# Patient Record
Sex: FEMALE | Race: BLACK OR AFRICAN AMERICAN | NOT HISPANIC OR LATINO | URBAN - METROPOLITAN AREA
[De-identification: names, ages, dates, MRNs, and addresses within clinical notes are randomized per-mention and may not be internally consistent; named-entity substitution may affect disease eponyms.]

---

## 2019-06-14 PROBLEM — Z00.00 ENCOUNTER FOR PREVENTIVE HEALTH EXAMINATION: Status: ACTIVE | Noted: 2019-06-14

## 2019-06-17 ENCOUNTER — OUTPATIENT (OUTPATIENT)
Dept: OUTPATIENT SERVICES | Facility: HOSPITAL | Age: 59
LOS: 1 days | End: 2019-06-17
Payer: COMMERCIAL

## 2019-06-17 DIAGNOSIS — Z01.818 ENCOUNTER FOR OTHER PREPROCEDURAL EXAMINATION: ICD-10-CM

## 2019-06-17 LAB
ANION GAP SERPL CALC-SCNC: 13 MMOL/L — SIGNIFICANT CHANGE UP (ref 5–17)
APTT BLD: 33.1 SEC — SIGNIFICANT CHANGE UP (ref 27.5–36.3)
BASOPHILS # BLD AUTO: 0 K/UL — SIGNIFICANT CHANGE UP (ref 0–0.2)
BASOPHILS NFR BLD AUTO: 0.4 % — SIGNIFICANT CHANGE UP (ref 0–2)
BUN SERPL-MCNC: 13 MG/DL — SIGNIFICANT CHANGE UP (ref 8–20)
CALCIUM SERPL-MCNC: 9.8 MG/DL — SIGNIFICANT CHANGE UP (ref 8.6–10.2)
CHLORIDE SERPL-SCNC: 100 MMOL/L — SIGNIFICANT CHANGE UP (ref 98–107)
CO2 SERPL-SCNC: 29 MMOL/L — SIGNIFICANT CHANGE UP (ref 22–29)
CREAT SERPL-MCNC: 0.5 MG/DL — SIGNIFICANT CHANGE UP (ref 0.5–1.3)
EOSINOPHIL # BLD AUTO: 0.2 K/UL — SIGNIFICANT CHANGE UP (ref 0–0.5)
EOSINOPHIL NFR BLD AUTO: 2.6 % — SIGNIFICANT CHANGE UP (ref 0–6)
GLUCOSE SERPL-MCNC: 138 MG/DL — HIGH (ref 70–115)
HBA1C BLD-MCNC: 7.8 % — HIGH (ref 4–5.6)
HCT VFR BLD CALC: 37.1 % — SIGNIFICANT CHANGE UP (ref 37–47)
HGB BLD-MCNC: 11.7 G/DL — LOW (ref 12–16)
INR BLD: 0.99 RATIO — SIGNIFICANT CHANGE UP (ref 0.88–1.16)
LYMPHOCYTES # BLD AUTO: 3.6 K/UL — SIGNIFICANT CHANGE UP (ref 1–4.8)
LYMPHOCYTES # BLD AUTO: 43.4 % — SIGNIFICANT CHANGE UP (ref 20–55)
MCHC RBC-ENTMCNC: 26.8 PG — LOW (ref 27–31)
MCHC RBC-ENTMCNC: 31.5 G/DL — LOW (ref 32–36)
MCV RBC AUTO: 84.9 FL — SIGNIFICANT CHANGE UP (ref 81–99)
MONOCYTES # BLD AUTO: 0.4 K/UL — SIGNIFICANT CHANGE UP (ref 0–0.8)
MONOCYTES NFR BLD AUTO: 5.2 % — SIGNIFICANT CHANGE UP (ref 3–10)
NEUTROPHILS # BLD AUTO: 4 K/UL — SIGNIFICANT CHANGE UP (ref 1.8–8)
NEUTROPHILS NFR BLD AUTO: 48.3 % — SIGNIFICANT CHANGE UP (ref 37–73)
PLATELET # BLD AUTO: 265 K/UL — SIGNIFICANT CHANGE UP (ref 150–400)
POTASSIUM SERPL-MCNC: 3.3 MMOL/L — LOW (ref 3.5–5.3)
POTASSIUM SERPL-SCNC: 3.3 MMOL/L — LOW (ref 3.5–5.3)
PROTHROM AB SERPL-ACNC: 11.4 SEC — SIGNIFICANT CHANGE UP (ref 10–12.9)
RBC # BLD: 4.37 M/UL — LOW (ref 4.4–5.2)
RBC # FLD: 13.7 % — SIGNIFICANT CHANGE UP (ref 11–15.6)
SODIUM SERPL-SCNC: 142 MMOL/L — SIGNIFICANT CHANGE UP (ref 135–145)
WBC # BLD: 8.3 K/UL — SIGNIFICANT CHANGE UP (ref 4.8–10.8)
WBC # FLD AUTO: 8.3 K/UL — SIGNIFICANT CHANGE UP (ref 4.8–10.8)

## 2019-06-17 PROCEDURE — 71046 X-RAY EXAM CHEST 2 VIEWS: CPT

## 2019-06-17 PROCEDURE — 83036 HEMOGLOBIN GLYCOSYLATED A1C: CPT

## 2019-06-17 PROCEDURE — 36415 COLL VENOUS BLD VENIPUNCTURE: CPT

## 2019-06-17 PROCEDURE — 85610 PROTHROMBIN TIME: CPT

## 2019-06-17 PROCEDURE — 85027 COMPLETE CBC AUTOMATED: CPT

## 2019-06-17 PROCEDURE — 85730 THROMBOPLASTIN TIME PARTIAL: CPT

## 2019-06-17 PROCEDURE — 93005 ELECTROCARDIOGRAM TRACING: CPT

## 2019-06-17 PROCEDURE — 71046 X-RAY EXAM CHEST 2 VIEWS: CPT | Mod: 26

## 2019-06-17 PROCEDURE — 93010 ELECTROCARDIOGRAM REPORT: CPT

## 2019-06-17 PROCEDURE — 80048 BASIC METABOLIC PNL TOTAL CA: CPT

## 2019-06-17 PROCEDURE — G0463: CPT

## 2019-06-20 ENCOUNTER — RESULT REVIEW (OUTPATIENT)
Age: 59
End: 2019-06-20

## 2021-02-18 ENCOUNTER — EMERGENCY (EMERGENCY)
Facility: HOSPITAL | Age: 61
LOS: 1 days | Discharge: HOME | End: 2021-02-18
Attending: STUDENT IN AN ORGANIZED HEALTH CARE EDUCATION/TRAINING PROGRAM
Payer: MEDICAID

## 2021-02-18 VITALS
SYSTOLIC BLOOD PRESSURE: 130 MMHG | RESPIRATION RATE: 18 BRPM | HEART RATE: 88 BPM | HEIGHT: 61 IN | WEIGHT: 147.05 LBS | OXYGEN SATURATION: 98 % | DIASTOLIC BLOOD PRESSURE: 87 MMHG | TEMPERATURE: 98 F

## 2021-02-18 VITALS
RESPIRATION RATE: 17 BRPM | SYSTOLIC BLOOD PRESSURE: 142 MMHG | DIASTOLIC BLOOD PRESSURE: 78 MMHG | OXYGEN SATURATION: 100 % | TEMPERATURE: 98 F | HEART RATE: 74 BPM

## 2021-02-18 LAB
ALBUMIN SERPL ELPH-MCNC: 4.6 G/DL — SIGNIFICANT CHANGE UP (ref 3.3–5.2)
ALP SERPL-CCNC: 80 U/L — SIGNIFICANT CHANGE UP (ref 40–120)
ALT FLD-CCNC: 32 U/L — SIGNIFICANT CHANGE UP
ANION GAP SERPL CALC-SCNC: 15 MMOL/L — SIGNIFICANT CHANGE UP (ref 5–17)
AST SERPL-CCNC: 28 U/L — SIGNIFICANT CHANGE UP
BASOPHILS # BLD AUTO: 0.05 K/UL — SIGNIFICANT CHANGE UP (ref 0–0.2)
BASOPHILS NFR BLD AUTO: 0.6 % — SIGNIFICANT CHANGE UP (ref 0–2)
BILIRUB SERPL-MCNC: 0.2 MG/DL — LOW (ref 0.4–2)
BUN SERPL-MCNC: 12 MG/DL — SIGNIFICANT CHANGE UP (ref 8–20)
CALCIUM SERPL-MCNC: 9.7 MG/DL — SIGNIFICANT CHANGE UP (ref 8.6–10.2)
CHLORIDE SERPL-SCNC: 99 MMOL/L — SIGNIFICANT CHANGE UP (ref 98–107)
CO2 SERPL-SCNC: 26 MMOL/L — SIGNIFICANT CHANGE UP (ref 22–29)
CREAT SERPL-MCNC: 0.4 MG/DL — LOW (ref 0.5–1.3)
EOSINOPHIL # BLD AUTO: 0.26 K/UL — SIGNIFICANT CHANGE UP (ref 0–0.5)
EOSINOPHIL NFR BLD AUTO: 3.1 % — SIGNIFICANT CHANGE UP (ref 0–6)
GLUCOSE SERPL-MCNC: 109 MG/DL — HIGH (ref 70–99)
HCT VFR BLD CALC: 39.6 % — SIGNIFICANT CHANGE UP (ref 34.5–45)
HGB BLD-MCNC: 12.6 G/DL — SIGNIFICANT CHANGE UP (ref 11.5–15.5)
IMM GRANULOCYTES NFR BLD AUTO: 0.2 % — SIGNIFICANT CHANGE UP (ref 0–1.5)
LYMPHOCYTES # BLD AUTO: 3.11 K/UL — SIGNIFICANT CHANGE UP (ref 1–3.3)
LYMPHOCYTES # BLD AUTO: 36.8 % — SIGNIFICANT CHANGE UP (ref 13–44)
MCHC RBC-ENTMCNC: 27.7 PG — SIGNIFICANT CHANGE UP (ref 27–34)
MCHC RBC-ENTMCNC: 31.8 GM/DL — LOW (ref 32–36)
MCV RBC AUTO: 87 FL — SIGNIFICANT CHANGE UP (ref 80–100)
MONOCYTES # BLD AUTO: 0.52 K/UL — SIGNIFICANT CHANGE UP (ref 0–0.9)
MONOCYTES NFR BLD AUTO: 6.2 % — SIGNIFICANT CHANGE UP (ref 2–14)
NEUTROPHILS # BLD AUTO: 4.49 K/UL — SIGNIFICANT CHANGE UP (ref 1.8–7.4)
NEUTROPHILS NFR BLD AUTO: 53.1 % — SIGNIFICANT CHANGE UP (ref 43–77)
PLATELET # BLD AUTO: 290 K/UL — SIGNIFICANT CHANGE UP (ref 150–400)
POTASSIUM SERPL-MCNC: 4.1 MMOL/L — SIGNIFICANT CHANGE UP (ref 3.5–5.3)
POTASSIUM SERPL-SCNC: 4.1 MMOL/L — SIGNIFICANT CHANGE UP (ref 3.5–5.3)
PROT SERPL-MCNC: 7.8 G/DL — SIGNIFICANT CHANGE UP (ref 6.6–8.7)
RBC # BLD: 4.55 M/UL — SIGNIFICANT CHANGE UP (ref 3.8–5.2)
RBC # FLD: 12.8 % — SIGNIFICANT CHANGE UP (ref 10.3–14.5)
SODIUM SERPL-SCNC: 140 MMOL/L — SIGNIFICANT CHANGE UP (ref 135–145)
WBC # BLD: 8.45 K/UL — SIGNIFICANT CHANGE UP (ref 3.8–10.5)
WBC # FLD AUTO: 8.45 K/UL — SIGNIFICANT CHANGE UP (ref 3.8–10.5)

## 2021-02-18 PROCEDURE — 99283 EMERGENCY DEPT VISIT LOW MDM: CPT

## 2021-02-18 PROCEDURE — 99284 EMERGENCY DEPT VISIT MOD MDM: CPT

## 2021-02-18 PROCEDURE — 72125 CT NECK SPINE W/O DYE: CPT

## 2021-02-18 PROCEDURE — 99284 EMERGENCY DEPT VISIT MOD MDM: CPT | Mod: 25

## 2021-02-18 PROCEDURE — 70496 CT ANGIOGRAPHY HEAD: CPT | Mod: 26,MA

## 2021-02-18 PROCEDURE — 70450 CT HEAD/BRAIN W/O DYE: CPT | Mod: 26,59

## 2021-02-18 PROCEDURE — 36415 COLL VENOUS BLD VENIPUNCTURE: CPT

## 2021-02-18 PROCEDURE — 72125 CT NECK SPINE W/O DYE: CPT | Mod: 26

## 2021-02-18 PROCEDURE — 85025 COMPLETE CBC W/AUTO DIFF WBC: CPT

## 2021-02-18 PROCEDURE — 70496 CT ANGIOGRAPHY HEAD: CPT

## 2021-02-18 PROCEDURE — 80053 COMPREHEN METABOLIC PANEL: CPT

## 2021-02-18 PROCEDURE — 70498 CT ANGIOGRAPHY NECK: CPT

## 2021-02-18 PROCEDURE — 70498 CT ANGIOGRAPHY NECK: CPT | Mod: 26,MA

## 2021-02-18 PROCEDURE — 70450 CT HEAD/BRAIN W/O DYE: CPT

## 2021-02-18 RX ORDER — METFORMIN HYDROCHLORIDE 850 MG/1
0 TABLET ORAL
Qty: 0 | Refills: 0 | DISCHARGE

## 2021-02-18 RX ORDER — SIMVASTATIN 20 MG/1
0 TABLET, FILM COATED ORAL
Qty: 0 | Refills: 0 | DISCHARGE

## 2021-02-18 RX ORDER — ACETAMINOPHEN 500 MG
975 TABLET ORAL ONCE
Refills: 0 | Status: COMPLETED | OUTPATIENT
Start: 2021-02-18 | End: 2021-02-18

## 2021-02-18 RX ADMIN — Medication 975 MILLIGRAM(S): at 15:23

## 2021-02-18 NOTE — ED STATDOCS - NS ED ROS FT
ROS:  GEN: (-) fevers/chills  NECK: (-) stiffness, (-) swelling  RESP: (-) shortness of breath, (-) cough  CV: (-) chest pain, (-) palpitations  GI: (-) nausea, (-) vomiting, (-) pain, (-) constipation, (-) diarrhea  : (-) hematuria, (-) dysuria  EXT: (-) edema  NEURO: (-) weakness, (+) headache, (-) dizziness, (-) syncope  MSK: (-) muscle pain

## 2021-02-18 NOTE — ED ADULT NURSE NOTE - NSIMPLEMENTINTERV_GEN_ALL_ED
Implemented All Fall Risk Interventions:  [place] to call system. Call bell, personal items and telephone within reach. Instruct patient to call for assistance. Room bathroom lighting operational. Non-slip footwear when patient is off stretcher. Physically safe environment: no spills, clutter or unnecessary equipment. Stretcher in lowest position, wheels locked, appropriate side rails in place. Provide visual cue, wrist band, yellow gown, etc. Monitor gait and stability. Monitor for mental status changes and reorient to person, place, and time. Review medications for side effects contributing to fall risk. Reinforce activity limits and safety measures with patient and family.

## 2021-02-18 NOTE — CHART NOTE - NSCHARTNOTEFT_GEN_A_CORE
Imaging results reviewed with attending.     CT Angio Neck w/ IV Cont (02.18.21 @ 18:01)   CT of the head demonstrates the ventricles and sulci to be normal in appearance. No intracranial mass effect or hemorrhage is seen. Again noted is a hyperdense soft tissue focus along the falx within the parietal region likely representing a calcified meningioma which crosses midline. A prominent lucency is seen below the right lentiform nucleus likely representing a dilated perivascular space. No mass effect or hemorrhage is seen.  IMPRESSION: Tortuosity of the cervical internal carotid arteries. Ectasia of the distal vertebral arteries. No occlusion, significant stenosis or other vascular abnormality identified.    Patient may follow up outpatient PRN w/ Dr. Rehman

## 2021-02-18 NOTE — ED ADULT TRIAGE NOTE - CHIEF COMPLAINT QUOTE
pt A&OX 4 from home s/p slip & fall on ice and hitting back of head on concrete. No bleeding noted, pt c/o nausea, HA, and generalized weakness.

## 2021-02-18 NOTE — CONSULT NOTE ADULT - ASSESSMENT
60F w/ PMHX of HTN, DMII on Metformin, asthma, osteoporosis s/p slip and fall on ice this AM, hitting back of head, -LOC, c/o nausea, head/neck pain. Patient states she has felt "off balance" for ~1 year, denies any other falls  - CTH possible meningioma        Plan  - Imaging reviewed  - HOB 30 degrees  - Recommend CTA Head/Neck r/o aneurysm   - Pain control as needed  - STAT CTH for worsening neuro status  - Medical management/ supportive care per primary team  - Further plan to follow imaging  - No acute neurosurgical intervention at this time  - D/w Dr. Rehman

## 2021-02-18 NOTE — ED STATDOCS - NSFOLLOWUPINSTRUCTIONS_ED_ALL_ED_FT
Follow up with neurosurgery in 1-2 weeks   Take Motrin and Tylenol as needed for pain   Follow up with your primary medical doctor in 2-3 days       Meningioma    WHAT YOU NEED TO KNOW:    A meningioma is a tumor that starts in the meninges of the brain and spinal cord. The meninges are the tissues that cover the brain and spinal cord. They prevent germs and other substances from entering the brain and spinal cord. Most meningiomas are slow-growing and benign (not cancer).    DISCHARGE INSTRUCTIONS:    Medicines:   •Medicines may be given to kill the tumor cells and decrease the size of the meningioma.     •Take your medicine as directed. Contact your healthcare provider if you think your medicine is not helping or if you have side effects. Tell him or her if you are allergic to any medicine. Keep a list of the medicines, vitamins, and herbs you take. Include the amounts, and when and why you take them. Bring the list or the pill bottles to follow-up visits. Carry your medicine list with you in case of an emergency.    Follow up with your healthcare provider or surgeon as directed: Write down your questions so you remember to ask them during your visits.     Self-care:   •Drink liquids as directed. Ask how much liquid to drink each day and which liquids are best for you. If you have nausea or diarrhea from treatment, extra liquids may help decrease your risk for dehydration.    •Eat healthy foods. Healthy foods include fruits, vegetables, whole-grain breads, low-fat dairy products, beans, lean meats, and fish. This may help you feel better during treatment and decrease side effects. You may need to change what you eat during treatment. Do not eat foods or drink liquids that cause gas, such as cabbage, beans, onions, or soft drinks. A nutritionist may help to plan the best meals and snacks for you.    •Exercise. Ask about the best exercise plan for you. Exercise may improve your energy levels and appetite.    Contact your healthcare provider if:   •You have a fever.     •You vomit repeatedly, and cannot keep any food or liquids down.    •You have a severe headache, or you feel dizzy.    •You have questions or concerns about your condition or care.      Return to the emergency department if:   •You have any of the following signs of a stroke: ?Numbness or drooping on one side of your face     ?Weakness in an arm or leg    ?Confusion or difficulty speaking    ?Dizziness, a severe headache, or vision loss

## 2021-02-18 NOTE — ED STATDOCS - ATTENDING CONTRIBUTION TO CARE
I, Mary Alice Mcneil, performed a face to face bedside interview with this patient regarding history of present illness, review of symptoms and relevant past medical, social and family history.  I completed an independent physical examination. Medical decision making, follow-up on ordered tests (ie labs, radiologic studies) and re-evaluation of the patient's status has been communicated to the ACP.  Disposition of the patient will be based on test outcome and response to ED interventions.

## 2021-02-18 NOTE — ED STATDOCS - CARE PROVIDER_API CALL
Edinson Rehman; PhD)  Neurosurgery  [address] [place] [address]  Phone: [phone]  Fax: [phone]  Follow Up Time:

## 2021-02-18 NOTE — CONSULT NOTE ADULT - SUBJECTIVE AND OBJECTIVE BOX
HISTORY OF PRESENT ILLNESS:   60 year old female w/ PMHX of HTN, DMII on Metformin, asthma, osteoporosis s/p slip and fall on ice this AM, hitting back of head, -LOC, c/o nausea, head/neck pain. Patient states she has felt "off balance" for ~1 year, denies any other falls. Currently states HA has improved w/ Tylenol.    PAST MEDICAL & SURGICAL HISTORY:       FAMILY HISTORY:  Denies any family hx of brain aneurysms or brain ca    SOCIAL HISTORY:  Tobacco Use: denies  EtOH use: social  Substance: denies    Allergies:  penicillin (Hives)  sulfa drugs (Hives; Other)    REVIEW OF SYSTEMS  Negative except as noted in HPI    HOME MEDICATIONS:  Home Medications: Metformin, Simvastatin, other BP meds unsure which      MEDICATIONS:  Antibiotics:    Neuro:    Anticoagulation:    OTHER:    IVF:      Vital Signs Last 24 Hrs  T(C): 36.6 (2021 09:36), Max: 36.6 (2021 09:36)  T(F): 97.8 (2021 09:36), Max: 97.8 (2021 09:36)  HR: 88 (2021 09:36) (88 - 88)  BP: 130/87 (2021 09:36) (130/87 - 130/87)  BP(mean): --  RR: 18 (2021 09:36) (18 - 18)  SpO2: 98% (2021 09:36) (98% - 98%)      PHYSICAL EXAM:  GENERAL: NAD, well-groomed, well-developed  HEAD: Atraumatic, normocephalic. Tender top of head to light palpation. No raccoon eyes, no terrazas signs b/l.   ENMT: Moist mucous membranes  NECK: Cervical tenderness s/p fall (baseline herniated disc for 20+ years)  LAWRENCE COMA SCORE: E-4 V-5 M-6 = 15  MENTAL STATUS: AAO x3; Awake; Opens eyes spontaneously; Appropriately conversant without aphasia. following simple commands  CRANIAL NERVES: Visual acuity normal for age, PERRL. EOMI without nystagmus. Facial sensation intact V1-3 distribution b/l. Face symmetric w/ normal eye closure and smile, tongue midline. Hearing grossly intact. Speech clear.   MOTOR: strength 5/5 b/l upper and lower extremities, no drift b/l UE.  SENSATION: grossly intact to light touch all extremities  COORDINATION: no upper extremity dysmetria  CHEST/LUNG: Nonlabored breaths  ABDOMEN: Soft, nontender, nondistended.    LABS:                CULTURES:      RADIOLOGY & ADDITIONAL STUDIES:  CT Head No Cont (21 @ 12:39)   IMPRESSION:  There is no evidence of skull fracture, intracranial hemorrhage or acute lobar infarct.  There is a left parafalcine partially calcified hyperdense mass in the parietal lobule which has increased in size since prior remote examination, presumably represents a calcified meningioma. Further correlation with MRI of the brain with and without contrast is suggested for more detailed characterization.  No cervical spine fracture.

## 2021-02-18 NOTE — ED STATDOCS - PATIENT PORTAL LINK FT
You can access the FollowMyHealth Patient Portal offered by [facility] by registering at the following website: [email or web address]. By joining [employer]’s FollowMyHealth portal, you will also be able to view your health information using other applications (apps) compatible with our system.

## 2021-02-18 NOTE — ED STATDOCS - CLINICAL SUMMARY MEDICAL DECISION MAKING FREE TEXT BOX
Slipped on ice and hit head on ground No LOC. Headache on arrival, not on blood thinners. Check Head/neck Ct to r/o TBI Slipped on ice and hit head on ground No LOC. Headache on arrival, not on blood thinners. Check Head/neck Ct to r/o traumatic injury

## 2021-02-18 NOTE — ED STATDOCS - OBJECTIVE STATEMENT
59 y/o F pt with significant PMHx of HTN, and DM  presents to the ED s/p slip and fall on ice 1 hour ago. Pt notes hitting head but denies LOC or bleeding. C/o headache, nausea and weakness. Denies associated fever, chills, SOB, vomiting, diarrhea, dizziness at the time of evaluation.

## 2021-02-18 NOTE — ED ADULT NURSE NOTE - OBJECTIVE STATEMENT
60 F, NAD, alert and oriented x 3, c/o of HA to left occipital area onset 0830 s/p mechanical slip and fall on ice. Pt denies LOC, denies pain at this time, denies blood thinners,  denies any other injuries. No facial droop, + PERRL + NAYAK, + equal strength to all extremities bilaterally. Full physical assessment deferred to acp/physician.

## 2021-02-18 NOTE — ED STATDOCS - PROGRESS NOTE DETAILS
ESTUARDO RUTHERFORD: CTH with brain mass possible meningioma, consulted neurosurgery. NSG recommends CTA head and neck Spoke with neurosurgery PA regarding CTA reads. States pt can be discharged home with out pt NSG follow up. Copy of results printed and provided to the pt. Return precautions provided.

## 2021-02-24 ENCOUNTER — APPOINTMENT (OUTPATIENT)
Dept: NEUROSURGERY | Facility: CLINIC | Age: 61
End: 2021-02-24
Payer: MEDICAID

## 2021-02-24 VITALS
HEIGHT: 61 IN | HEART RATE: 78 BPM | TEMPERATURE: 98.3 F | DIASTOLIC BLOOD PRESSURE: 84 MMHG | BODY MASS INDEX: 27.75 KG/M2 | WEIGHT: 147 LBS | SYSTOLIC BLOOD PRESSURE: 145 MMHG | OXYGEN SATURATION: 98 %

## 2021-02-24 DIAGNOSIS — Z82.49 FAMILY HISTORY OF ISCHEMIC HEART DISEASE AND OTHER DISEASES OF THE CIRCULATORY SYSTEM: ICD-10-CM

## 2021-02-24 DIAGNOSIS — Z78.9 OTHER SPECIFIED HEALTH STATUS: ICD-10-CM

## 2021-02-24 DIAGNOSIS — Z86.79 PERSONAL HISTORY OF OTHER DISEASES OF THE CIRCULATORY SYSTEM: ICD-10-CM

## 2021-02-24 DIAGNOSIS — Z82.5 FAMILY HISTORY OF ASTHMA AND OTHER CHRONIC LOWER RESPIRATORY DISEASES: ICD-10-CM

## 2021-02-24 DIAGNOSIS — Z86.39 PERSONAL HISTORY OF OTHER ENDOCRINE, NUTRITIONAL AND METABOLIC DISEASE: ICD-10-CM

## 2021-02-24 DIAGNOSIS — Z80.9 FAMILY HISTORY OF MALIGNANT NEOPLASM, UNSPECIFIED: ICD-10-CM

## 2021-02-24 DIAGNOSIS — Z87.09 PERSONAL HISTORY OF OTHER DISEASES OF THE RESPIRATORY SYSTEM: ICD-10-CM

## 2021-02-24 DIAGNOSIS — Z63.4 DISAPPEARANCE AND DEATH OF FAMILY MEMBER: ICD-10-CM

## 2021-02-24 DIAGNOSIS — Z72.89 OTHER PROBLEMS RELATED TO LIFESTYLE: ICD-10-CM

## 2021-02-24 DIAGNOSIS — Z83.3 FAMILY HISTORY OF DIABETES MELLITUS: ICD-10-CM

## 2021-02-24 PROBLEM — E78.00 PURE HYPERCHOLESTEROLEMIA, UNSPECIFIED: Chronic | Status: ACTIVE | Noted: 2021-02-18

## 2021-02-24 PROBLEM — E11.9 TYPE 2 DIABETES MELLITUS WITHOUT COMPLICATIONS: Chronic | Status: ACTIVE | Noted: 2021-02-18

## 2021-02-24 PROCEDURE — 99214 OFFICE O/P EST MOD 30 MIN: CPT

## 2021-02-24 PROCEDURE — 99072 ADDL SUPL MATRL&STAF TM PHE: CPT

## 2021-02-24 RX ORDER — METFORMIN ER 500 MG 500 MG/1
500 TABLET ORAL
Refills: 0 | Status: ACTIVE | COMMUNITY

## 2021-02-24 RX ORDER — LOSARTAN POTASSIUM 100 MG/1
TABLET, FILM COATED ORAL
Refills: 0 | Status: ACTIVE | COMMUNITY

## 2021-02-24 RX ORDER — SIMVASTATIN 80 MG/1
TABLET, FILM COATED ORAL
Refills: 0 | Status: ACTIVE | COMMUNITY

## 2021-02-24 SDOH — SOCIAL STABILITY - SOCIAL INSECURITY: DISSAPEARANCE AND DEATH OF FAMILY MEMBER: Z63.4

## 2021-02-25 NOTE — PHYSICAL EXAM
[General Appearance - Alert] : alert [General Appearance - In No Acute Distress] : in no acute distress [General Appearance - Well Nourished] : well nourished [General Appearance - Well Developed] : well developed [Oriented To Time, Place, And Person] : oriented to person, place, and time [Impaired Insight] : insight and judgment were intact [Affect] : the affect was normal [Mood] : the mood was normal [Person] : oriented to person [Place] : oriented to place [Time] : oriented to time [Short Term Intact] : short term memory intact [Fluency] : fluency intact [Comprehension] : comprehension intact [Cranial Nerves Optic (II)] : visual acuity intact bilaterally,  pupils equal round and reactive to light [Cranial Nerves Oculomotor (III)] : extraocular motion intact [Cranial Nerves Trigeminal (V)] : facial sensation intact symmetrically [Cranial Nerves Facial (VII)] : face symmetrical [Cranial Nerves Glossopharyngeal (IX)] : tongue and palate midline [Cranial Nerves Accessory (XI - Cranial And Spinal)] : head turning and shoulder shrug symmetric [Cranial Nerves Hypoglossal (XII)] : there was no tongue deviation with protrusion [Motor Tone] : muscle tone was normal in all four extremities [Motor Strength] : muscle strength was normal in all four extremities [Sensation Tactile Decrease] : light touch was intact [Sensation Pain / Temperature Decrease] : pain and temperature was intact [Balance] : balance was intact [Over the Past 2 Weeks, Have You Felt Down, Depressed, or Hopeless?] : 1.) Over the past 2 weeks, have you felt down, depressed, or hopeless? No [Over the Past 2 Weeks, Have You Felt Little Interest or Pleasure Doing Things?] : 2.) Over the past 2 weeks, have you felt little interest or pleasure doing things? No [FreeTextEntry8] : difficulty with tandem walking

## 2021-02-25 NOTE — DATA REVIEWED
[de-identified] : \par  EXAM: CT ANGIO NECK (W)AW IC\par  EXAM: CT ANGIO BRAIN (W)AW IC\par \par PROCEDURE DATE: 02/18/2021\par \par \par \par INTERPRETATION: INDICATIONS: Status post fall and imbalance. Rule out aneurysm..\par \par TECHNIQUE: A CT scan of the head with and without intravenous contrast and a CT angiogram study of the neck and head were performed.\par \par The head CT was performed with 5 mm axial images. The CT angiogram study was performed with axial thin section images with 2-D and 3-D reformatted series.\par \par 90 cc intravenous Omnipaque 350 contrast was administered, 10 cc contrast was discarded.\par \par COMPARISON: Brain CT 2/18/2021\par \par FINDINGS:\par Neck CTA:\par \par The visualized aorta and great vessels are unremarkable. 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 There are separate origins of the left common carotid, right common carotid and right subclavian arteries from the aortic arch. The common carotid arteries are patent.\par The internal carotid arteries and external carotid arteries are patent. There is tortuosity of the cervical internal carotid arteries.\par The vertebral arteries are patent and codominant.\par \par Low-density right thyroid nodule 1.2 cm.\par \par Brain CTA:\par \par The distal internal carotid arteries are patent.\par The [place] of [place] is normal in appearance. There are fetal origins of the posterior cerebral arteries bilaterally.\par The visualized cerebral arteries are unremarkable.\par The vertebrobasilar junction and basilar artery are patent. The distal vertebral arteries are ectatic.\par \par All measurements are performed using standard NASCET criteria.\par \par CT of the head demonstrates the ventricles and sulci to be normal in appearance. No intracranial mass effect or hemorrhage is seen. Again noted is a hyperdense soft tissue focus along the falx within the parietal region likely representing a calcified meningioma which crosses midline. A prominent lucency is seen below the right lentiform nucleus likely representing a dilated perivascular space. No mass effect or hemorrhage is seen.\par \par IMPRESSION: Tortuosity of the cervical internal carotid arteries.\par \par Ectasia of the distal vertebral arteries.\par \par No occlusion, significant stenosis or other vascular abnormality identified.\par \par \par \par

## 2021-02-25 NOTE — REVIEW OF SYSTEMS
[Dizziness] : dizziness [Sore Throat] : sore throat [Palpitations] : palpitations [Wheezing] : wheezing [Joint Pain] : joint pain [As Noted in HPI] : as noted in HPI [Negative] : Heme/Lymph [Incontinence] : no incontinence [de-identified] : Headaches [FreeTextEntry4] : Ringing in ears

## 2021-02-25 NOTE — ASSESSMENT
[FreeTextEntry1] : Ms. Anat Momin presents with above history and imaging.\par She is neurologically intact.  \par \par Plan:\par MRI brain w/wo contrast in 3 months to assess interval progression of meningioma. \par Follow up after imaging for formal review. \par Physical therapy for assistance with balance and gait.\par Continue BP medications as ordered to maintained BP <140/90. \par Continue statin therapy with an LDL goal of < 70 for secondary stroke prevention.\par PMD for incidental finding of right thyroid nodule. \par Patient knows to call the office if there are any new or worsening symptoms.\par \par \par \par Stroke prevention requires management of risk factors and patient education.  Risk factors include smoking, excess weight, hypertension, dyslipidemia, heavy alcohol use, physical inactivity, obesity and diabetes.   Blood pressure should be < 140/90.  Lipid- lowering agents may reduce risk of stroke.  patient to maintain regular exercise and body weight and control intake of alcohol.\par

## 2021-02-25 NOTE — REASON FOR VISIT
[Follow-Up: _____] : a [unfilled] follow-up visit [Family Member] : family member [FreeTextEntry1] : Ms. Momin is a 60 year old female who presents for follow up visit/post hospitalization [facility]. She was s/p fall slipping on ice. \par Patient denies any headaches, n/v or visual disturbances.  She has not had any seizures.  Denies any numbness/tingling or weakness of extremities. Patient admits to unsteadiness of gait and difficulty with coordination.  \par \par Patient states she is compliant with her antihypertensives. \par Patient is a non smoker.\par No personal history of cancer.  Family history includes esophageal and lung cancer. \par \par PMD Dr. Osiel Moreno

## 2021-04-30 ENCOUNTER — APPOINTMENT (OUTPATIENT)
Dept: NEUROSURGERY | Facility: CLINIC | Age: 61
End: 2021-04-30
Payer: MEDICAID

## 2021-04-30 VITALS
BODY MASS INDEX: 27.94 KG/M2 | DIASTOLIC BLOOD PRESSURE: 82 MMHG | TEMPERATURE: 97.6 F | HEIGHT: 61 IN | WEIGHT: 148 LBS | HEART RATE: 79 BPM | OXYGEN SATURATION: 97 % | SYSTOLIC BLOOD PRESSURE: 137 MMHG

## 2021-04-30 PROCEDURE — 99213 OFFICE O/P EST LOW 20 MIN: CPT

## 2021-04-30 PROCEDURE — 99072 ADDL SUPL MATRL&STAF TM PHE: CPT

## 2021-05-02 NOTE — DATA REVIEWED
[de-identified] : R. Phys. Phone: [phone]\par MRI-3T BRAIN PRE AND POST IV CONTRAST\par \par HISTORY:\par \par Technique: Multiplanar, multisequence MR imaging of the brain was performed with\par and without 14 cc Clariscan.\par \par FINDINGS:\par \par Comparison:\par \par Brain:\par 10 mm falcine meningioma at the frontoparietal junction in the midline.\par There are a few small focal areas of increased T2 signal seen within the white\par matter both hemispheres.\par 8 mm [place]chow-Jeff spaces in the right anterior perforating substances.\par The remainder the brain parenchyma, ventricular system and subarachnoid spaces\par are normal.\par \par Ventricles: The ventricles, sulci, and cisterns are normal in caliber for\par patient's age without evidence for hydrocephalus.\par \par Flow voids: The flow voids at the skull base are unremarkable.\par \par Extra-axial spaces:There is no extra-axial collection or extra-axial mass.\par \par Extracranial findings: The craniocervical junction is within normal limits. 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

## 2021-05-02 NOTE — PHYSICAL EXAM
[General Appearance - Alert] : alert [General Appearance - In No Acute Distress] : in no acute distress [General Appearance - Well Nourished] : well nourished [General Appearance - Well Developed] : well developed [Oriented To Time, Place, And Person] : oriented to person, place, and time [Impaired Insight] : insight and judgment were intact [Affect] : the affect was normal [Mood] : the mood was normal [Person] : oriented to person [Place] : oriented to place [Time] : oriented to time [Short Term Intact] : short term memory intact [Fluency] : fluency intact [Comprehension] : comprehension intact [Cranial Nerves Optic (II)] : visual acuity intact bilaterally,  pupils equal round and reactive to light [Cranial Nerves Oculomotor (III)] : extraocular motion intact [Cranial Nerves Trigeminal (V)] : facial sensation intact symmetrically [Cranial Nerves Facial (VII)] : face symmetrical [Cranial Nerves Glossopharyngeal (IX)] : tongue and palate midline [Cranial Nerves Accessory (XI - Cranial And Spinal)] : head turning and shoulder shrug symmetric [Cranial Nerves Hypoglossal (XII)] : there was no tongue deviation with protrusion [Motor Tone] : muscle tone was normal in all four extremities [Motor Strength] : muscle strength was normal in all four extremities [Sensation Pain / Temperature Decrease] : pain and temperature was intact [Sensation Tactile Decrease] : light touch was intact [Balance] : balance was intact [Over the Past 2 Weeks, Have You Felt Down, Depressed, or Hopeless?] : 1.) Over the past 2 weeks, have you felt down, depressed, or hopeless? No [Over the Past 2 Weeks, Have You Felt Little Interest or Pleasure Doing Things?] : 2.) Over the past 2 weeks, have you felt little interest or pleasure doing things? No [FreeTextEntry8] : difficulty with tandem walking

## 2021-05-02 NOTE — ASSESSMENT
[FreeTextEntry1] : Ms. Momin presents with the above history and imaging\par She is neurologically intact\par \par \par No complaints referable to meningioma and no evidence of significant change when compared to previous.\par \par Plan:\par MRI brain w/wo contrast to surveillance of meningioma.\par f/u after imaging.\par Will recommend carotid doppler for evaluation of tortuous carotid vessels noted on CTA\par Patient knows to call the office if there are any new or worsening symptoms.\par \par \par Patient has been given an opportunity to ask and have their questions answered to their satisfaction.\par

## 2021-10-29 ENCOUNTER — APPOINTMENT (OUTPATIENT)
Dept: NEUROSURGERY | Facility: CLINIC | Age: 61
End: 2021-10-29
Payer: COMMERCIAL

## 2021-10-29 VITALS
WEIGHT: 149 LBS | DIASTOLIC BLOOD PRESSURE: 81 MMHG | HEART RATE: 63 BPM | OXYGEN SATURATION: 8 % | BODY MASS INDEX: 28.13 KG/M2 | HEIGHT: 61 IN | SYSTOLIC BLOOD PRESSURE: 160 MMHG | TEMPERATURE: 98.1 F

## 2021-10-29 DIAGNOSIS — D32.9 BENIGN NEOPLASM OF MENINGES, UNSPECIFIED: ICD-10-CM

## 2021-10-29 PROCEDURE — 99213 OFFICE O/P EST LOW 20 MIN: CPT

## 2021-10-29 NOTE — ASSESSMENT
[FreeTextEntry1] : Ms. Momin presents with the above history and imaging\par She is neurologically intact\par \par \par No complaints referable to meningioma and no evidence of significant change when compared to previous.\par \par Plan:\par MRI brain w/wo contrast to surveillance of meningioma 10/2022\par f/u after imaging.\par Opthalmology for formal exam. \par Will recommend carotid doppler for evaluation of tortuous carotid vessels noted on CTA\par Patient knows to call the office if there are any new or worsening symptoms.\par \par \par Patient has been given an opportunity to ask and have their questions answered to their satisfaction.\par

## 2021-10-29 NOTE — PHYSICAL EXAM
[General Appearance - Alert] : alert [General Appearance - In No Acute Distress] : in no acute distress [General Appearance - Well Nourished] : well nourished [General Appearance - Well Developed] : well developed [Oriented To Time, Place, And Person] : oriented to person, place, and time [Impaired Insight] : insight and judgment were intact [Affect] : the affect was normal [Mood] : the mood was normal [Over the Past 2 Weeks, Have You Felt Down, Depressed, or Hopeless?] : 1.) Over the past 2 weeks, have you felt down, depressed, or hopeless? No [Over the Past 2 Weeks, Have You Felt Little Interest or Pleasure Doing Things?] : 2.) Over the past 2 weeks, have you felt little interest or pleasure doing things? No [Person] : oriented to person [Place] : oriented to place [Time] : oriented to time [Short Term Intact] : short term memory intact [Fluency] : fluency intact [Comprehension] : comprehension intact [Cranial Nerves Optic (II)] : visual acuity intact bilaterally,  pupils equal round and reactive to light [Cranial Nerves Oculomotor (III)] : extraocular motion intact [Cranial Nerves Trigeminal (V)] : facial sensation intact symmetrically [Cranial Nerves Facial (VII)] : face symmetrical [Cranial Nerves Glossopharyngeal (IX)] : tongue and palate midline [Cranial Nerves Accessory (XI - Cranial And Spinal)] : head turning and shoulder shrug symmetric [Cranial Nerves Hypoglossal (XII)] : there was no tongue deviation with protrusion [Motor Tone] : muscle tone was normal in all four extremities [Motor Strength] : muscle strength was normal in all four extremities [Sensation Tactile Decrease] : light touch was intact [Sensation Pain / Temperature Decrease] : pain and temperature was intact [Balance] : balance was intact [FreeTextEntry8] : difficulty with tandem walking

## 2021-10-29 NOTE — DATA REVIEWED
[de-identified] : R. Phys. Phone: [phone]\par MRI-3T BRAIN PRE AND POST IV CONTRAST\par \par HISTORY:\par \par Technique: Multiplanar, multisequence MR imaging of the brain was performed with\par and without 14 cc Clariscan.\par \par FINDINGS:\par \par Comparison:\par \par Brain:\par 10 mm falcine meningioma at the frontoparietal junction in the midline.\par There are a few small focal areas of increased T2 signal seen within the white\par matter both hemispheres.\par 8 mm [place] spaces in the right anterior perforating substances.\par The remainder the brain parenchyma, ventricular system and subarachnoid spaces\par are normal.\par \par Ventricles: The ventricles, sulci, and cisterns are normal in caliber for\par patient's age without evidence for hydrocephalus.\par \par Flow voids: The flow voids at the skull base are unremarkable.\par \par Extra-axial spaces:There is no extra-axial collection or extra-axial mass.\par \par Extracranial findings: The craniocervical junction is within normal limits. 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

## 2021-10-29 NOTE — REASON FOR VISIT
[Follow-Up: _____] : a [unfilled] follow-up visit [FreeTextEntry1] : ANGEL PABON is a 60 year old female being seen for a follow-up visit at 3 month jayesh with repeat MRI brain imaging for surviellance of  falx within the parietal region a calcified meningioma which crosses midline. \par Patient accompanied by family member. \par \par Ms. Pabon is a 60 year old female who presents for follow up visit/post hospitalization [facility]. She was s/p fall slipping on ice. \par Patient denies any headaches, n/v or visual disturbances. Needs to complete optic nerve testing.  She has not had any seizures. Denies any numbness/tingling or weakness of extremities. Patient admits to unsteadiness of gait and difficulty with coordination. \par \par Patient states she is compliant with her antihypertensives. \par Patient is a non smoker.\par No personal history of cancer. Family history includes esophageal and lung cancer. \par \par PMD Dr. Osiel Moreno \par

## 2022-10-05 NOTE — REASON FOR VISIT
[Follow-Up: _____] : a [unfilled] follow-up visit [FreeTextEntry1] : ANGEL PABON is a 60 year old female being seen for a follow-up visit at 3 month jayesh with repeat MRI brain imaging for surviellance of  falx within the parietal region a calcified meningioma which crosses midline. \par Patient accompanied by family member. \par \par Ms. Pabon is a 60 year old female who presents for follow up visit/post hospitalization [facility]. She was s/p fall slipping on ice. \par Patient denies any headaches, n/v or visual disturbances. She has not had any seizures. Denies any numbness/tingling or weakness of extremities. Patient admits to unsteadiness of gait and difficulty with coordination. \par \par Patient states she is compliant with her antihypertensives. \par Patient is a non smoker.\par No personal history of cancer. Family history includes esophageal and lung cancer. \par \par PMD Dr. Osiel Moreno \par  details…

## 2023-05-14 ENCOUNTER — EMERGENCY (EMERGENCY)
Facility: HOSPITAL | Age: 63
LOS: 1 days | Discharge: HOME | End: 2023-05-14
Payer: COMMERCIAL

## 2023-05-14 VITALS
HEART RATE: 82 BPM | WEIGHT: 141.1 LBS | DIASTOLIC BLOOD PRESSURE: 102 MMHG | TEMPERATURE: 98 F | SYSTOLIC BLOOD PRESSURE: 161 MMHG | RESPIRATION RATE: 20 BRPM | OXYGEN SATURATION: 100 %

## 2023-05-14 LAB
APPEARANCE UR: ABNORMAL
BACTERIA # UR AUTO: ABNORMAL
BILIRUB UR-MCNC: NEGATIVE — SIGNIFICANT CHANGE UP
COLOR SPEC: YELLOW — SIGNIFICANT CHANGE UP
COMMENT - URINE: SIGNIFICANT CHANGE UP
DIFF PNL FLD: NEGATIVE — SIGNIFICANT CHANGE UP
EPI CELLS # UR: SIGNIFICANT CHANGE UP
GLUCOSE UR QL: NEGATIVE MG/DL — SIGNIFICANT CHANGE UP
HYALINE CASTS # UR AUTO: ABNORMAL /LPF
KETONES UR-MCNC: NEGATIVE — SIGNIFICANT CHANGE UP
LEUKOCYTE ESTERASE UR-ACNC: ABNORMAL
NITRITE UR-MCNC: NEGATIVE — SIGNIFICANT CHANGE UP
PH UR: 8 — SIGNIFICANT CHANGE UP (ref 5–8)
PROT UR-MCNC: 30 MG/DL
RBC CASTS # UR COMP ASSIST: SIGNIFICANT CHANGE UP /HPF (ref 0–4)
SP GR SPEC: 1.01 — SIGNIFICANT CHANGE UP (ref 1.01–1.02)
UROBILINOGEN FLD QL: NEGATIVE MG/DL — SIGNIFICANT CHANGE UP
WBC UR QL: SIGNIFICANT CHANGE UP /HPF (ref 0–5)

## 2023-05-14 PROCEDURE — 72131 CT LUMBAR SPINE W/O DYE: CPT | Mod: 26,MA

## 2023-05-14 PROCEDURE — 99284 EMERGENCY DEPT VISIT MOD MDM: CPT | Mod: 25

## 2023-05-14 PROCEDURE — 87086 URINE CULTURE/COLONY COUNT: CPT

## 2023-05-14 PROCEDURE — 96372 THER/PROPH/DIAG INJ SC/IM: CPT

## 2023-05-14 PROCEDURE — 99284 EMERGENCY DEPT VISIT MOD MDM: CPT

## 2023-05-14 PROCEDURE — 72131 CT LUMBAR SPINE W/O DYE: CPT | Mod: MA

## 2023-05-14 PROCEDURE — 81001 URINALYSIS AUTO W/SCOPE: CPT

## 2023-05-14 RX ORDER — IBUPROFEN 200 MG
1 TABLET ORAL
Qty: 20 | Refills: 0
Start: 2023-05-14 | End: 2023-05-18

## 2023-05-14 RX ORDER — LIDOCAINE 4 G/100G
1 CREAM TOPICAL ONCE
Refills: 0 | Status: COMPLETED | OUTPATIENT
Start: 2023-05-14 | End: 2023-05-14

## 2023-05-14 RX ORDER — KETOROLAC TROMETHAMINE 30 MG/ML
15 SYRINGE (ML) INJECTION ONCE
Refills: 0 | Status: DISCONTINUED | OUTPATIENT
Start: 2023-05-14 | End: 2023-05-14

## 2023-05-14 RX ORDER — ACETAMINOPHEN 500 MG
975 TABLET ORAL ONCE
Refills: 0 | Status: COMPLETED | OUTPATIENT
Start: 2023-05-14 | End: 2023-05-14

## 2023-05-14 RX ORDER — METHOCARBAMOL 500 MG/1
2 TABLET, FILM COATED ORAL
Qty: 6 | Refills: 0
Start: 2023-05-14 | End: 2023-05-16

## 2023-05-14 RX ADMIN — LIDOCAINE 1 PATCH: 4 CREAM TOPICAL at 12:33

## 2023-05-14 RX ADMIN — Medication 975 MILLIGRAM(S): at 12:34

## 2023-05-14 RX ADMIN — Medication 15 MILLIGRAM(S): at 12:33

## 2023-05-14 NOTE — ED PROVIDER NOTE - CLINICAL SUMMARY MEDICAL DECISION MAKING FREE TEXT BOX
ESTUARDO Coelho: 63yo F p/w L lower back pain. denied recent injury/fall. on initial eval, non-toxic appearing female found sitting in chair, no acute distress, ambulatory with limp, full ROM and strength of ext x4 and spine, neuro intact without deficits, remainder of PE WNL. VSS. awaiting CT lumbar. ordered Toradol, Tylenol and lidocaine patch. sent meds to pharmacy. pt reported improvement after meds given. discussed supportive care measures and return precautions. pt verbalized understanding and agreement ESTUARDO Coelho: 61yo F p/w L lower back pain. denied recent injury/fall. on initial eval, non-toxic appearing female found sitting in chair, no acute distress, ambulatory with limp, full ROM and strength of ext x4 and spine, neuro intact without deficits, remainder of PE WNL. VSS. UA neg for infectious pathology. awaiting CT lumbar. ordered Toradol, Tylenol and lidocaine patch. sent meds to pharmacy. pt reported improvement after meds given. discussed supportive care measures and return precautions. pt verbalized understanding and agreement ESTUARDO Coelho: 63yo F p/w L lower back pain. denied recent injury/fall. on initial eval, non-toxic appearing female found sitting in chair, no acute distress, ambulatory with limp, full ROM and strength of ext x4 and spine, neuro intact without deficits, remainder of PE WNL. VSS. UA neg for infectious pathology. CT lumbar neg for fx or any other acute pathology. ordered Toradol, Tylenol and lidocaine patch. sent meds to pharmacy. pt reported improvement after meds given. discussed supportive care measures and return precautions. pt verbalized understanding and agreement

## 2023-05-14 NOTE — ED PROVIDER NOTE - MUSCULOSKELETAL, MLM
Left paraspinal tenderness, no midline tenderness. Spine appears normal, range of motion is not limited.

## 2023-05-14 NOTE — ED PROVIDER NOTE - OBJECTIVE STATEMENT
Patient is a 63 y/o female with history of DM, HTN, COPD complaining of left flank pain x2 days. Patient states pain began when she woke up, denying trauma or acute incident. She describes pain as sharp, 10/10 on the pain scale, noting increased pain with movement and breathing. Patient states Tylenol offered no relief with most recent dose last night. Patient denies radiation to the leg, history of cancer, fever, chills, dysuria, frequency, hematuria, abdominal pain, N/V/D, chest pain, SOB, rash. 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 Patient states pain began when she woke up, denying trauma or acute incident. She describes pain as sharp, 10/10 on the pain scale, noting increased pain with movement and breathing. Patient states Tylenol offered no relief with most recent dose last night. Patient denies radiation to the leg, history of cancer, fever, chills, dysuria, frequency, hematuria, abdominal pain, N/V/D, chest pain, SOB, rash.    ESTUARDO Coelho: 61yo F presents to ED c/o L lower back pain. denies abdominal pain or flank pain despite student note. pt reports no improvement of sx with Tylenol 500mg and oxycodone (last dose yesterday). pt denies recent injury/trauma/fall. pain worse with standing from seated position. 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 Otherwise agree with findings of student.

## 2023-05-14 NOTE — ED PROVIDER NOTE - PATIENT PORTAL LINK FT
You can access the FollowMyHealth Patient Portal offered by [facility] by registering at the following website: [email or web address]. By joining [employer]’s FollowMyHealth portal, you will also be able to view your health information using other applications (apps) compatible with our system.

## 2023-05-14 NOTE — ED PROVIDER NOTE - PHYSICAL EXAMINATION
ESTUARDO Coelho: General: non-toxic appearing, in no acute distress, A+Ox3  CV: RRR, nl s1/s2.  Resp: CTAB, normal rate and effort  GI: Abdomen soft, NT, ND. Active bowel sounds. No rebound, no guarding  : No CVAT  Neuro: sensorimotor intact without deficits   MSK: L paraspinal tenderness, Full ROM and strength ext x 4. ambulatory with limp  Skin: No rashes, lacerations, abrasions, ecchymosis. intact and perfused.   otherwise agree with findings of Student

## 2023-05-14 NOTE — ED ADULT TRIAGE NOTE - CHIEF COMPLAINT QUOTE
Patient presents to ER C/O left lower back pain, denies urinary symptoms, resp even/unlabored, no n/V.

## 2023-05-16 LAB
CULTURE RESULTS: SIGNIFICANT CHANGE UP
SPECIMEN SOURCE: SIGNIFICANT CHANGE UP

## 2024-03-07 NOTE — ED PROVIDER NOTE - NS_EDPROVIDERDISPOUSERTYPE_ED_A_ED
Benjamin Guzman.  Cardiology  [address] [place] [address]  Phone: [phone]  Fax: [phone]  Follow Up Time: 1-3 Days   I have personally evaluated and examined the patient. The Attending was available to me as a supervising provider if needed.

## 2024-12-25 PROBLEM — F10.90 ALCOHOL USE: Status: ACTIVE | Noted: 2021-02-24

## 2025-01-07 ENCOUNTER — EMERGENCY (EMERGENCY)
Facility: HOSPITAL | Age: 65
LOS: 1 days | Discharge: HOME | End: 2025-01-07
Attending: STUDENT IN AN ORGANIZED HEALTH CARE EDUCATION/TRAINING PROGRAM
Payer: COMMERCIAL

## 2025-01-07 VITALS
TEMPERATURE: 98 F | HEART RATE: 79 BPM | WEIGHT: 144.18 LBS | SYSTOLIC BLOOD PRESSURE: 146 MMHG | OXYGEN SATURATION: 99 % | RESPIRATION RATE: 18 BRPM | DIASTOLIC BLOOD PRESSURE: 93 MMHG | HEIGHT: 61 IN

## 2025-01-07 LAB
ALBUMIN SERPL ELPH-MCNC: 4.1 G/DL — SIGNIFICANT CHANGE UP (ref 3.3–5.2)
ALP SERPL-CCNC: 110 U/L — SIGNIFICANT CHANGE UP (ref 40–120)
ALT FLD-CCNC: 26 U/L — SIGNIFICANT CHANGE UP
ANION GAP SERPL CALC-SCNC: 12 MMOL/L — SIGNIFICANT CHANGE UP (ref 5–17)
APPEARANCE UR: CLEAR — SIGNIFICANT CHANGE UP
AST SERPL-CCNC: 22 U/L — SIGNIFICANT CHANGE UP
BACTERIA # UR AUTO: ABNORMAL /HPF
BASOPHILS # BLD AUTO: 0.07 K/UL — SIGNIFICANT CHANGE UP (ref 0–0.2)
BASOPHILS NFR BLD AUTO: 0.7 % — SIGNIFICANT CHANGE UP (ref 0–2)
BILIRUB SERPL-MCNC: 0.2 MG/DL — LOW (ref 0.4–2)
BILIRUB UR-MCNC: NEGATIVE — SIGNIFICANT CHANGE UP
BUN SERPL-MCNC: 10 MG/DL — SIGNIFICANT CHANGE UP (ref 8–20)
CALCIUM SERPL-MCNC: 9.1 MG/DL — SIGNIFICANT CHANGE UP (ref 8.4–10.5)
CAST: 0 /LPF — SIGNIFICANT CHANGE UP (ref 0–4)
CHLORIDE SERPL-SCNC: 105 MMOL/L — SIGNIFICANT CHANGE UP (ref 96–108)
CO2 SERPL-SCNC: 27 MMOL/L — SIGNIFICANT CHANGE UP (ref 22–29)
COLOR SPEC: YELLOW — SIGNIFICANT CHANGE UP
CREAT SERPL-MCNC: 0.51 MG/DL — SIGNIFICANT CHANGE UP (ref 0.5–1.3)
DIFF PNL FLD: NEGATIVE — SIGNIFICANT CHANGE UP
EGFR: 103 ML/MIN/1.73M2 — SIGNIFICANT CHANGE UP
EOSINOPHIL # BLD AUTO: 0.26 K/UL — SIGNIFICANT CHANGE UP (ref 0–0.5)
EOSINOPHIL NFR BLD AUTO: 2.8 % — SIGNIFICANT CHANGE UP (ref 0–6)
GLUCOSE SERPL-MCNC: 118 MG/DL — HIGH (ref 70–99)
GLUCOSE UR QL: NEGATIVE MG/DL — SIGNIFICANT CHANGE UP
HCT VFR BLD CALC: 36.4 % — SIGNIFICANT CHANGE UP (ref 34.5–45)
HGB BLD-MCNC: 11.5 G/DL — SIGNIFICANT CHANGE UP (ref 11.5–15.5)
IMM GRANULOCYTES NFR BLD AUTO: 0.2 % — SIGNIFICANT CHANGE UP (ref 0–0.9)
KETONES UR-MCNC: ABNORMAL MG/DL
LEUKOCYTE ESTERASE UR-ACNC: NEGATIVE — SIGNIFICANT CHANGE UP
LIDOCAIN IGE QN: 27 U/L — SIGNIFICANT CHANGE UP (ref 22–51)
LYMPHOCYTES # BLD AUTO: 3.82 K/UL — HIGH (ref 1–3.3)
LYMPHOCYTES # BLD AUTO: 40.6 % — SIGNIFICANT CHANGE UP (ref 13–44)
MCHC RBC-ENTMCNC: 26.6 PG — LOW (ref 27–34)
MCHC RBC-ENTMCNC: 31.6 G/DL — LOW (ref 32–36)
MCV RBC AUTO: 84.3 FL — SIGNIFICANT CHANGE UP (ref 80–100)
MONOCYTES # BLD AUTO: 0.61 K/UL — SIGNIFICANT CHANGE UP (ref 0–0.9)
MONOCYTES NFR BLD AUTO: 6.5 % — SIGNIFICANT CHANGE UP (ref 2–14)
NEUTROPHILS # BLD AUTO: 4.64 K/UL — SIGNIFICANT CHANGE UP (ref 1.8–7.4)
NEUTROPHILS NFR BLD AUTO: 49.2 % — SIGNIFICANT CHANGE UP (ref 43–77)
NITRITE UR-MCNC: NEGATIVE — SIGNIFICANT CHANGE UP
PH UR: 6.5 — SIGNIFICANT CHANGE UP (ref 5–8)
PLATELET # BLD AUTO: 253 K/UL — SIGNIFICANT CHANGE UP (ref 150–400)
POTASSIUM SERPL-MCNC: 4 MMOL/L — SIGNIFICANT CHANGE UP (ref 3.5–5.3)
POTASSIUM SERPL-SCNC: 4 MMOL/L — SIGNIFICANT CHANGE UP (ref 3.5–5.3)
PROT SERPL-MCNC: 6.8 G/DL — SIGNIFICANT CHANGE UP (ref 6.6–8.7)
PROT UR-MCNC: 30 MG/DL
RBC # BLD: 4.32 M/UL — SIGNIFICANT CHANGE UP (ref 3.8–5.2)
RBC # FLD: 13.3 % — SIGNIFICANT CHANGE UP (ref 10.3–14.5)
RBC CASTS # UR COMP ASSIST: 2 /HPF — SIGNIFICANT CHANGE UP (ref 0–4)
SODIUM SERPL-SCNC: 144 MMOL/L — SIGNIFICANT CHANGE UP (ref 135–145)
SP GR SPEC: 1.02 — SIGNIFICANT CHANGE UP (ref 1–1.03)
SQUAMOUS # UR AUTO: 6 /HPF — HIGH (ref 0–5)
UROBILINOGEN FLD QL: 1 MG/DL — SIGNIFICANT CHANGE UP (ref 0.2–1)
WBC # BLD: 9.42 K/UL — SIGNIFICANT CHANGE UP (ref 3.8–10.5)
WBC # FLD AUTO: 9.42 K/UL — SIGNIFICANT CHANGE UP (ref 3.8–10.5)
WBC UR QL: 2 /HPF — SIGNIFICANT CHANGE UP (ref 0–5)

## 2025-01-07 PROCEDURE — 74177 CT ABD & PELVIS W/CONTRAST: CPT | Mod: 26

## 2025-01-07 PROCEDURE — 83690 ASSAY OF LIPASE: CPT

## 2025-01-07 PROCEDURE — 87086 URINE CULTURE/COLONY COUNT: CPT

## 2025-01-07 PROCEDURE — 76705 ECHO EXAM OF ABDOMEN: CPT | Mod: 26

## 2025-01-07 PROCEDURE — 96374 THER/PROPH/DIAG INJ IV PUSH: CPT | Mod: XU

## 2025-01-07 PROCEDURE — 74177 CT ABD & PELVIS W/CONTRAST: CPT | Mod: MC

## 2025-01-07 PROCEDURE — 99284 EMERGENCY DEPT VISIT MOD MDM: CPT | Mod: 25

## 2025-01-07 PROCEDURE — 85025 COMPLETE CBC W/AUTO DIFF WBC: CPT

## 2025-01-07 PROCEDURE — 36415 COLL VENOUS BLD VENIPUNCTURE: CPT

## 2025-01-07 PROCEDURE — 81001 URINALYSIS AUTO W/SCOPE: CPT

## 2025-01-07 PROCEDURE — 87186 SC STD MICRODIL/AGAR DIL: CPT

## 2025-01-07 PROCEDURE — 99285 EMERGENCY DEPT VISIT HI MDM: CPT

## 2025-01-07 PROCEDURE — 80053 COMPREHEN METABOLIC PANEL: CPT

## 2025-01-07 PROCEDURE — 87077 CULTURE AEROBIC IDENTIFY: CPT

## 2025-01-07 PROCEDURE — 76705 ECHO EXAM OF ABDOMEN: CPT

## 2025-01-07 RX ORDER — SODIUM CHLORIDE 9 MG/ML
1000 INJECTION, SOLUTION INTRAMUSCULAR; INTRAVENOUS; SUBCUTANEOUS ONCE
Refills: 0 | Status: COMPLETED | OUTPATIENT
Start: 2025-01-07 | End: 2025-01-07

## 2025-01-07 RX ORDER — ACETAMINOPHEN 80 MG/.8ML
650 SOLUTION/ DROPS ORAL ONCE
Refills: 0 | Status: COMPLETED | OUTPATIENT
Start: 2025-01-07 | End: 2025-01-07

## 2025-01-07 RX ORDER — KETOROLAC TROMETHAMINE 30 MG/ML
15 INJECTION INTRAMUSCULAR; INTRAVENOUS ONCE
Refills: 0 | Status: DISCONTINUED | OUTPATIENT
Start: 2025-01-07 | End: 2025-01-07

## 2025-01-07 RX ORDER — CYCLOBENZAPRINE HCL 10 MG
10 TABLET ORAL ONCE
Refills: 0 | Status: DISCONTINUED | OUTPATIENT
Start: 2025-01-07 | End: 2025-01-07

## 2025-01-07 RX ADMIN — ACETAMINOPHEN 650 MILLIGRAM(S): 80 SOLUTION/ DROPS ORAL at 12:28

## 2025-01-07 RX ADMIN — SODIUM CHLORIDE 1000 MILLILITER(S): 9 INJECTION, SOLUTION INTRAMUSCULAR; INTRAVENOUS; SUBCUTANEOUS at 12:28

## 2025-01-07 RX ADMIN — KETOROLAC TROMETHAMINE 15 MILLIGRAM(S): 30 INJECTION INTRAMUSCULAR; INTRAVENOUS at 12:28

## 2025-01-07 NOTE — ED PROVIDER NOTE - CLINICAL SUMMARY MEDICAL DECISION MAKING FREE TEXT BOX
Dr. Schilling: See attending attestation. Dr. Schilling: See attending attestation.    63 y/o female with PMH of DM, HTN, HLD, asthma, and COPD presents with RUQ and flank pain beginning this morning. States the pain began randomly while making breakfast, was intense at its start and has not changed in character since. Physical exam significant for + [place] sign and right sided CVA tenderness. VSS and patient hemodynamically stable. Ordered CBC, CMP, UA, UC, lipase, RUQ US, CTAP. Dispo pending results. Dr. Schilling: See attending attestation.    65 y/o female with PMH of DM, HTN, HLD, asthma, and COPD presents with RUQ and flank pain beginning this morning. States the pain began randomly while making breakfast, was intense at its start and has not changed in character since. Physical exam significant for + [place] sign and right sided CVA tenderness. VSS and patient hemodynamically stable. Ordered CBC, CMP, UA, UC, lipase, RUQ US, CTAP. RUQ US showed no gallbladder disease or biliary dilatation, no acute findings. Dispo pending CT results. Dr. Schilling: See attending attestation.    65 y/o female with PMH of DM, HTN, HLD, asthma, and COPD presents with RUQ and flank pain beginning this morning. States the pain began randomly while making breakfast, was intense at its start and has not changed in character since. Physical exam significant for + [place] sign and right sided CVA tenderness. VSS and patient hemodynamically stable. Ordered CBC, CMP, UA, UC, lipase, RUQ US, CTAP. Patient with proteinuria, discussed with her regarding proper followup. RUQ US showed no gallbladder disease or biliary dilatation, no acute findings. CT revealed no acute findings, only an adnexal cyst which was discussed with patient. Patient endorsing improvement in pain. Patient passed PO challenge without N/V. Patient hemodynamically stable for discharge home with proper follow-up.

## 2025-01-07 NOTE — ED PROVIDER NOTE - ATTENDING APP SHARED VISIT CONTRIBUTION OF CARE
I have personally performed a history and physical examination of the patient and discussed management with the TANYA as well as the patient.  I reviewed the TANYA's note and agree with the documented findings and plan of care.  I have authored and modified critical sections of the Provider Note, including but not limited to HPI, Physical Exam and MDM.    65 y/o female with PMH of DM, HTN, HLD, asthma, and COPD presents with RUQ and flank pain beginning this morning. States the pain began randomly while making breakfast, was intense at its start and has not changed in character since.  + [place] sign on examination.  + Right CVA TTP.  Consider cholecystitis versus UTI versus pyelonephritis.  Patient hemodynamically stable.  Will provide symptomatic control as needed, obtain CBC, CMP, UA, UC, lipase, RUQ US, CTAP.  Disposition pending results.

## 2025-01-07 NOTE — ED PROVIDER NOTE - OBJECTIVE STATEMENT
63 y/o female with PMH of DM, HTN, HLD, asthma, and COPD presents with RUQ and flank pain beginning this morning. States the pain began randomly while making breakfast, was intense at its start and has not changed in character since. Unknown whether or not pain is changed with food intake. Denies attempting any alleviating factors such as medications. Denies any changes to diet, new medications, new exposures, or sick contacts. Denies headache, blurry vision, chest pain, SOB, N/V/C/D, urinary burning/frequency, hematuria, blood in stool.

## 2025-01-07 NOTE — ED PROVIDER NOTE - NSFOLLOWUPINSTRUCTIONS_ED_ALL_ED_FT
Please follow up with your primary care physician regarding today's hospital visit.   Please follow-up for proteinuria with primary care physician.     Please return to the ED with any concerns, chest pain, SOB, intractable vomiting/pain, new or worsening signs/symptoms.

## 2025-01-07 NOTE — ED PROVIDER NOTE - PATIENT PORTAL LINK FT
You can access the FollowMyHealth Patient Portal offered by [facility] by registering at the following website: [email or web address]. By joining [employer]’s FollowMyHealth portal, you will also be able to view your health information using other applications (apps) compatible with our system.

## 2025-01-07 NOTE — ED ADULT NURSE NOTE - OBJECTIVE STATEMENT
PT presents to ED for right flank pain radiating to abdomen. States pain started this morning. C/O intermittent nausea. Denies fevers or chills. IV placed labs drawn and pt medicated per oders
